# Patient Record
Sex: FEMALE
[De-identification: names, ages, dates, MRNs, and addresses within clinical notes are randomized per-mention and may not be internally consistent; named-entity substitution may affect disease eponyms.]

---

## 2017-07-17 ENCOUNTER — APPOINTMENT (OUTPATIENT)
Dept: INTERNAL MEDICINE | Facility: CLINIC | Age: 77
End: 2017-07-17

## 2017-10-23 ENCOUNTER — APPOINTMENT (OUTPATIENT)
Dept: INTERNAL MEDICINE | Facility: CLINIC | Age: 77
End: 2017-10-23
Payer: MEDICARE

## 2017-10-23 PROCEDURE — 99214 OFFICE O/P EST MOD 30 MIN: CPT

## 2018-04-02 ENCOUNTER — APPOINTMENT (OUTPATIENT)
Dept: INTERNAL MEDICINE | Facility: CLINIC | Age: 78
End: 2018-04-02

## 2018-10-19 ENCOUNTER — APPOINTMENT (OUTPATIENT)
Dept: INTERNAL MEDICINE | Facility: CLINIC | Age: 78
End: 2018-10-19
Payer: MEDICARE

## 2018-10-19 VITALS
DIASTOLIC BLOOD PRESSURE: 82 MMHG | SYSTOLIC BLOOD PRESSURE: 144 MMHG | TEMPERATURE: 99.2 F | WEIGHT: 139 LBS | HEIGHT: 60.63 IN | OXYGEN SATURATION: 98 % | HEART RATE: 62 BPM | BODY MASS INDEX: 26.59 KG/M2

## 2018-10-19 PROCEDURE — 99214 OFFICE O/P EST MOD 30 MIN: CPT

## 2018-10-19 NOTE — ASSESSMENT
[FreeTextEntry1] : F/U VISIT OF 78 Y OLD FEM WITH PMX OF NON HODGKIN LYMPHOMA 5 + YEARS ,RECENTLY DX WITH FOLLICULAR LYMPHOMA AFTER R AXILLARY BIOPSY ON ORAL CHEMO= F/U ONCOLOGY\par ANXIETY= REASSURANCE\par HTN= STABLE\par RTO 3 M OR PRN\par PT UP TO DATE WITH IMMUNIZATIONS

## 2018-10-19 NOTE — HISTORY OF PRESENT ILLNESS
[de-identified] : PT COMES FOR F/U,DX WITH RECURRENT LYMPHOMA BY AXILLARY BIOPSY,PLACE ON ORAL CHEMO AND BEEN SEEN WEEKLY BY HEM ONC,FEELING ANXIOUS BUT WITH NO SIDE EFFECTS

## 2018-10-19 NOTE — PHYSICAL EXAM

## 2019-02-22 ENCOUNTER — APPOINTMENT (OUTPATIENT)
Dept: INTERNAL MEDICINE | Facility: CLINIC | Age: 79
End: 2019-02-22
Payer: MEDICARE

## 2019-02-22 VITALS
WEIGHT: 139 LBS | OXYGEN SATURATION: 97 % | HEIGHT: 60.63 IN | SYSTOLIC BLOOD PRESSURE: 142 MMHG | TEMPERATURE: 97.9 F | RESPIRATION RATE: 12 BRPM | BODY MASS INDEX: 26.59 KG/M2 | DIASTOLIC BLOOD PRESSURE: 81 MMHG | HEART RATE: 61 BPM

## 2019-02-22 PROCEDURE — 99214 OFFICE O/P EST MOD 30 MIN: CPT

## 2019-02-22 NOTE — HISTORY OF PRESENT ILLNESS
[de-identified] : PT COMES FOR F/U;BEEN ON RESEARCH PROTOCOL FOR LYMPHOMA ON ? MED,WAS STOP RECENTLY DUE TO LFT'S ELEVATION\par CC OF RECURRENT LEFT KNEE PAIN,SEEN BY ORTHO IN THE PAST AND WAS HELPED BY PT\par CC OF SPLIT LEFT EAR LOBE 4 M AGO AND WANTS IT TO GET REPAIR SO SHE CAN USE EARING AGAIN\par FLUCTUATING APPETITE BEEN RX ENSURE BY ONCOLOGIST TAKING ONE A DAY

## 2019-02-22 NOTE — PHYSICAL EXAM

## 2019-02-22 NOTE — ASSESSMENT
[FreeTextEntry1] : F/U VISIT OF 78 Y OLD FEM WITH PMX OF LYMPHOMA= F/U ONCOLOGY\par ABNORMAL AST AND ALT AFTER RESEARCH PROTOCOL = AS PER ONCOLOGY \par OA LEFT KNEE= REFER TO PT\par SPLIT LEFT EAR LOBE= REFER TO SEE DERMATOLOGY\par DYSLIPIDEMIA= ONLIPITOR 10 MG AS LONG AS AST AND ALT ARE TRENDING DOWN AND OFF CHEMOTHERAPY

## 2019-04-01 ENCOUNTER — APPOINTMENT (OUTPATIENT)
Dept: INTERNAL MEDICINE | Facility: CLINIC | Age: 79
End: 2019-04-01
Payer: MEDICARE

## 2019-04-01 VITALS
OXYGEN SATURATION: 98 % | WEIGHT: 140 LBS | RESPIRATION RATE: 12 BRPM | SYSTOLIC BLOOD PRESSURE: 151 MMHG | BODY MASS INDEX: 26.78 KG/M2 | DIASTOLIC BLOOD PRESSURE: 87 MMHG | HEIGHT: 60.63 IN | TEMPERATURE: 97.8 F | HEART RATE: 65 BPM

## 2019-04-01 DIAGNOSIS — M17.12 UNILATERAL PRIMARY OSTEOARTHRITIS, LEFT KNEE: ICD-10-CM

## 2019-04-01 PROCEDURE — 99214 OFFICE O/P EST MOD 30 MIN: CPT

## 2019-04-01 NOTE — REVIEW OF SYSTEMS
[Muscle Pain] : muscle pain [Hair Changes] : hair changes [Negative] : Heme/Lymph [FreeTextEntry4] : SPLIT LEFT EAR LOBE

## 2019-04-01 NOTE — ASSESSMENT
[FreeTextEntry1] : F/U VISIT OF 78 Y OLD FEM WITH PMX OF FOLLICULAR LYMPHOMA= F/U ONCOLOGY( ON REMISSION)\par DYSLIPIDEMIA= CONTINUE LIPITOR\par SPLIT LEFT EAR LOBE= TO SEE PLASTIC SURGEON\par HAIR LOSS= F/U DERM\par ANXIETY= DISCUSSED\par RTO 3 M

## 2019-04-01 NOTE — HISTORY OF PRESENT ILLNESS
[de-identified] : PT COMES FOR F/U \par CC OF HAIR LOSS AFTER CHEMO,BEEN SEEN BY DERM\par CC OF LLE PARESTHESIAS WHILE IN BED,BETTER WHEN MOVING IT\par LEFT KNEE PAIN IS BETTER AFTER PT\par BRINGS LABS FROM ONCOLOGY WITH NORMALIZATION OF LIVER ENZYMES AND CONTROL CHOL LEVELS

## 2019-04-01 NOTE — PHYSICAL EXAM

## 2019-04-22 ENCOUNTER — APPOINTMENT (OUTPATIENT)
Dept: INTERNAL MEDICINE | Facility: CLINIC | Age: 79
End: 2019-04-22
Payer: MEDICARE

## 2019-04-22 VITALS
OXYGEN SATURATION: 98 % | SYSTOLIC BLOOD PRESSURE: 133 MMHG | RESPIRATION RATE: 14 BRPM | HEART RATE: 67 BPM | HEIGHT: 60 IN | TEMPERATURE: 98 F | BODY MASS INDEX: 28.27 KG/M2 | WEIGHT: 144 LBS | DIASTOLIC BLOOD PRESSURE: 77 MMHG

## 2019-04-22 PROCEDURE — 99213 OFFICE O/P EST LOW 20 MIN: CPT

## 2019-04-22 NOTE — HISTORY OF PRESENT ILLNESS
[de-identified] : PT COMES FOR F/U AFTER BEEN AT LOCAL HOSPITAL FOR LLE PAIN \par D/C ON NO MEDS,RECOMM MOTRIN PRN\par PAIN IS MUCH BETTER ABLE TO WALK AND CLIMB STAIRS WITH NO PAIN BUT CONCERN\par ALSO CC OF BACK PAIN,LEFT HIP PAIN AND LLE PAIN HOURS AFTER PT\par LASTLY STILL LOOKING TO HAVE REPAIR OF LEFT EAR LOBE

## 2019-04-22 NOTE — REVIEW OF SYSTEMS
[Joint Pain] : joint pain [Muscle Pain] : muscle pain [Back Pain] : back pain [Negative] : Heme/Lymph [de-identified] : SEE HPI

## 2019-04-22 NOTE — PHYSICAL EXAM
[No Acute Distress] : no acute distress [No JVD] : no jugular venous distention [No Respiratory Distress] : no respiratory distress  [Normal Rate] : normal rate  [No Edema] : there was no peripheral edema [de-identified] : FROM LEFT KNEE,MINOR DEFORMITYFROM LEFT HIP MINOR LEFT PARAVERTEBRAL LUMBAR MUSCLE TENDERNESS [de-identified] : SPLIT LEFT EAR LOBE

## 2019-04-22 NOTE — ASSESSMENT
[FreeTextEntry1] : F/U VISIT OF 78 Y OLD FEM AFTER SEEN AT ER FOR LEFT LUMBAR ,HIP AND KNEE PAIN 1 WEEK A MODIFIED PT SESSION RECOMM,REFER TO SEE ORTHO\par SPLIT LEFT EAR LOBE= PLASTIC SURGICAL REF

## 2019-08-09 ENCOUNTER — APPOINTMENT (OUTPATIENT)
Dept: INTERNAL MEDICINE | Facility: CLINIC | Age: 79
End: 2019-08-09
Payer: MEDICARE

## 2019-08-09 VITALS
HEART RATE: 67 BPM | SYSTOLIC BLOOD PRESSURE: 128 MMHG | TEMPERATURE: 98.5 F | OXYGEN SATURATION: 98 % | RESPIRATION RATE: 14 BRPM | BODY MASS INDEX: 27.68 KG/M2 | WEIGHT: 141 LBS | HEIGHT: 60 IN | DIASTOLIC BLOOD PRESSURE: 77 MMHG

## 2019-08-09 DIAGNOSIS — Q17.8 OTHER SPECIFIED CONGENITAL MALFORMATIONS OF EAR: ICD-10-CM

## 2019-08-09 PROCEDURE — 99214 OFFICE O/P EST MOD 30 MIN: CPT

## 2019-08-09 NOTE — ASSESSMENT
[FreeTextEntry1] : F/U VISIT OF 78 Y OLD FEM WITH PMX OF GRADE 1 FOLLICULAR LYMPHOMA= AS PER ONCOLOGY\par HTN AND DYSLIPIDEMIA= STABLE\par ANXIETY= REASSURANCE PROVIDED\par RTO IN 3 M OR PRN

## 2019-08-09 NOTE — PHYSICAL EXAM
[No Acute Distress] : no acute distress [Well Nourished] : well nourished [Well Developed] : well developed [Well-Appearing] : well-appearing [Normal Sclera/Conjunctiva] : normal sclera/conjunctiva [PERRL] : pupils equal round and reactive to light [EOMI] : extraocular movements intact [Normal Oropharynx] : the oropharynx was normal [Normal Outer Ear/Nose] : the outer ears and nose were normal in appearance [No Lymphadenopathy] : no lymphadenopathy [No JVD] : no jugular venous distention [Thyroid Normal, No Nodules] : the thyroid was normal and there were no nodules present [Supple] : supple [No Respiratory Distress] : no respiratory distress  [No Accessory Muscle Use] : no accessory muscle use [Clear to Auscultation] : lungs were clear to auscultation bilaterally [Regular Rhythm] : with a regular rhythm [Normal Rate] : normal rate  [Normal S1, S2] : normal S1 and S2 [No Murmur] : no murmur heard [No Abdominal Bruit] : a ~M bruit was not heard ~T in the abdomen [No Carotid Bruits] : no carotid bruits [No Varicosities] : no varicosities [Pedal Pulses Present] : the pedal pulses are present [No Edema] : there was no peripheral edema [No Palpable Aorta] : no palpable aorta [No Extremity Clubbing/Cyanosis] : no extremity clubbing/cyanosis [Soft] : abdomen soft [Non Tender] : non-tender [No Masses] : no abdominal mass palpated [Non-distended] : non-distended [No HSM] : no HSM [Normal Posterior Cervical Nodes] : no posterior cervical lymphadenopathy [Normal Bowel Sounds] : normal bowel sounds [No CVA Tenderness] : no CVA  tenderness [Normal Anterior Cervical Nodes] : no anterior cervical lymphadenopathy [No Spinal Tenderness] : no spinal tenderness [No Joint Swelling] : no joint swelling [Grossly Normal Strength/Tone] : grossly normal strength/tone [No Rash] : no rash [Coordination Grossly Intact] : coordination grossly intact [No Focal Deficits] : no focal deficits [Normal Gait] : normal gait [Normal Affect] : the affect was normal [Deep Tendon Reflexes (DTR)] : deep tendon reflexes were 2+ and symmetric [Normal Insight/Judgement] : insight and judgment were intact

## 2019-08-09 NOTE — HISTORY OF PRESENT ILLNESS
[de-identified] : PT COMES FOR F/U \par SEEN BY NEUROLOGY EVERY 4 M;SEEN BY ONCOLOGY YESTERDAY ,NO FURTHER RECOMM \par SEEN BY GEN SURGEON DR ALEJANDRO ZAVALA AND HAS GUERA FOR MAMMO AND BREAST SONO  8/15/19\par SEEN BY CARDIOLOGY 2 WEEKS AGO ,HAD LABS DONE AND CAROTID US ORDERED\par LOVAZA WAS D/C AND PLACED ON VASCEPA

## 2019-08-23 ENCOUNTER — APPOINTMENT (OUTPATIENT)
Dept: INTERNAL MEDICINE | Facility: CLINIC | Age: 79
End: 2019-08-23
Payer: MEDICARE

## 2019-08-23 VITALS
TEMPERATURE: 98.1 F | RESPIRATION RATE: 14 BRPM | DIASTOLIC BLOOD PRESSURE: 79 MMHG | SYSTOLIC BLOOD PRESSURE: 138 MMHG | WEIGHT: 142 LBS | BODY MASS INDEX: 27.88 KG/M2 | HEART RATE: 58 BPM | HEIGHT: 60 IN | OXYGEN SATURATION: 98 %

## 2019-08-23 PROCEDURE — 99214 OFFICE O/P EST MOD 30 MIN: CPT

## 2019-08-23 NOTE — ASSESSMENT
[FreeTextEntry1] : 79 Y OLD FEM WITH PMX OF LYMPHOMA AND ? INCREASE IN SIZE OF AXILLARY LYMPH NODE= REFER TO SEE ONCOLOGY  WITH CD FOR COMPARISONS ,SPOKE WITH SURGEON WHO RECOMM BIOPSY UNDER US GUIDANCE BY RADIOLOGY ,PT FIRST WANTS TO SEE ONCOLOGY AGAIN

## 2019-08-23 NOTE — HISTORY OF PRESENT ILLNESS
[de-identified] : PT WAS SENT BY SURGEON FOR FURTHER RECOMM REGARDING ABNORMAL BREAST/AXILLARY SONO.

## 2019-08-23 NOTE — PHYSICAL EXAM
[No Acute Distress] : no acute distress [No JVD] : no jugular venous distention [No Respiratory Distress] : no respiratory distress  [Normal Rate] : normal rate

## 2019-11-13 ENCOUNTER — APPOINTMENT (OUTPATIENT)
Dept: INTERNAL MEDICINE | Facility: CLINIC | Age: 79
End: 2019-11-13
Payer: MEDICARE

## 2019-11-13 VITALS
HEART RATE: 58 BPM | OXYGEN SATURATION: 98 % | BODY MASS INDEX: 28.66 KG/M2 | SYSTOLIC BLOOD PRESSURE: 138 MMHG | TEMPERATURE: 98.3 F | WEIGHT: 146 LBS | HEIGHT: 60 IN | RESPIRATION RATE: 14 BRPM | DIASTOLIC BLOOD PRESSURE: 74 MMHG

## 2019-11-13 PROCEDURE — G0008: CPT

## 2019-11-13 PROCEDURE — 99397 PER PM REEVAL EST PAT 65+ YR: CPT | Mod: 25,GY

## 2019-11-13 PROCEDURE — 90662 IIV NO PRSV INCREASED AG IM: CPT

## 2019-11-13 NOTE — ASSESSMENT
[FreeTextEntry1] : CPE OF 79 Y OLD FEM WITH PMX OF HTN ,DYSLIPIDEMIA ,ANXIETY DISORDER AND FOLLICULAR LYMPHOMA= INFLUENZA VACCINE TODAY \par LABS REVIEWED AND STABLE\par RO IN 4 M

## 2019-11-13 NOTE — PHYSICAL EXAM
[No Acute Distress] : no acute distress [Well Nourished] : well nourished [Well Developed] : well developed [Well-Appearing] : well-appearing [Normal Sclera/Conjunctiva] : normal sclera/conjunctiva [PERRL] : pupils equal round and reactive to light [EOMI] : extraocular movements intact [Normal Outer Ear/Nose] : the outer ears and nose were normal in appearance [Normal Oropharynx] : the oropharynx was normal [No JVD] : no jugular venous distention [No Lymphadenopathy] : no lymphadenopathy [Supple] : supple [No Respiratory Distress] : no respiratory distress  [Thyroid Normal, No Nodules] : the thyroid was normal and there were no nodules present [No Accessory Muscle Use] : no accessory muscle use [Clear to Auscultation] : lungs were clear to auscultation bilaterally [Regular Rhythm] : with a regular rhythm [Normal Rate] : normal rate  [Normal S1, S2] : normal S1 and S2 [No Murmur] : no murmur heard [No Carotid Bruits] : no carotid bruits [No Abdominal Bruit] : a ~M bruit was not heard ~T in the abdomen [Pedal Pulses Present] : the pedal pulses are present [No Edema] : there was no peripheral edema [No Extremity Clubbing/Cyanosis] : no extremity clubbing/cyanosis [No Palpable Aorta] : no palpable aorta [Non Tender] : non-tender [Soft] : abdomen soft [Non-distended] : non-distended [No Masses] : no abdominal mass palpated [No HSM] : no HSM [Normal Bowel Sounds] : normal bowel sounds [Normal Posterior Cervical Nodes] : no posterior cervical lymphadenopathy [Normal Anterior Cervical Nodes] : no anterior cervical lymphadenopathy [No CVA Tenderness] : no CVA  tenderness [No Spinal Tenderness] : no spinal tenderness [No Joint Swelling] : no joint swelling [Grossly Normal Strength/Tone] : grossly normal strength/tone [No Rash] : no rash [No Focal Deficits] : no focal deficits [Coordination Grossly Intact] : coordination grossly intact [Deep Tendon Reflexes (DTR)] : deep tendon reflexes were 2+ and symmetric [Normal Gait] : normal gait [Normal Affect] : the affect was normal [Normal Insight/Judgement] : insight and judgment were intact [de-identified] : SMALL BORA LOWER EXTR VARICOSE VEINS ,NON TENDER

## 2019-11-13 NOTE — HISTORY OF PRESENT ILLNESS
[de-identified] : PT DUE FOR CPE\par SEEN BY CARDIOLOGY WITH LABS AND EKG \par WILL SEE HEMATOLOGY IN AM\par DEVELOPED LEFT SHIN ECCHYMOSIS  RECENTLY WITH ? NO TRAUMA ,NOW RESOLVED

## 2020-05-20 ENCOUNTER — APPOINTMENT (OUTPATIENT)
Dept: INTERNAL MEDICINE | Facility: CLINIC | Age: 80
End: 2020-05-20
Payer: COMMERCIAL

## 2020-05-20 VITALS
TEMPERATURE: 98.6 F | SYSTOLIC BLOOD PRESSURE: 126 MMHG | RESPIRATION RATE: 14 BRPM | HEART RATE: 63 BPM | HEIGHT: 60 IN | WEIGHT: 148 LBS | DIASTOLIC BLOOD PRESSURE: 73 MMHG | BODY MASS INDEX: 29.06 KG/M2 | OXYGEN SATURATION: 96 %

## 2020-05-20 PROCEDURE — 99214 OFFICE O/P EST MOD 30 MIN: CPT

## 2020-05-20 RX ORDER — FLUTICASONE FUROATE AND VILANTEROL TRIFENATATE 200; 25 UG/1; UG/1
200-25 POWDER RESPIRATORY (INHALATION)
Qty: 180 | Refills: 0 | Status: ACTIVE | COMMUNITY
Start: 2020-05-09

## 2020-05-20 RX ORDER — ALBUTEROL SULFATE 90 UG/1
108 (90 BASE) INHALANT RESPIRATORY (INHALATION)
Qty: 54 | Refills: 0 | Status: ACTIVE | COMMUNITY
Start: 2020-04-17

## 2020-05-20 NOTE — ASSESSMENT
[FreeTextEntry1] : F/U VISIT OF 79 Y OLD FEM WITH PMX OF HTN ,DYSLIPIDEMIA ,FOLLICULAR LYMPHOMA AND GABE= LABS REVIEWED\par RTO IN 4 M \par F/U ONCOLOGY

## 2020-05-20 NOTE — PHYSICAL EXAM
[No Acute Distress] : no acute distress [Well Nourished] : well nourished [Well Developed] : well developed [Well-Appearing] : well-appearing [Normal Sclera/Conjunctiva] : normal sclera/conjunctiva [PERRL] : pupils equal round and reactive to light [EOMI] : extraocular movements intact [No JVD] : no jugular venous distention [No Lymphadenopathy] : no lymphadenopathy [Supple] : supple [Thyroid Normal, No Nodules] : the thyroid was normal and there were no nodules present [No Respiratory Distress] : no respiratory distress  [Clear to Auscultation] : lungs were clear to auscultation bilaterally [No Accessory Muscle Use] : no accessory muscle use [Normal Rate] : normal rate  [Regular Rhythm] : with a regular rhythm [Normal S1, S2] : normal S1 and S2 [No Murmur] : no murmur heard [No Carotid Bruits] : no carotid bruits [No Abdominal Bruit] : a ~M bruit was not heard ~T in the abdomen [No Varicosities] : no varicosities [Pedal Pulses Present] : the pedal pulses are present [No Edema] : there was no peripheral edema [No Palpable Aorta] : no palpable aorta [No Extremity Clubbing/Cyanosis] : no extremity clubbing/cyanosis [Soft] : abdomen soft [Non-distended] : non-distended [Non Tender] : non-tender [No Masses] : no abdominal mass palpated [No HSM] : no HSM [Normal Bowel Sounds] : normal bowel sounds [Normal Posterior Cervical Nodes] : no posterior cervical lymphadenopathy [Normal Anterior Cervical Nodes] : no anterior cervical lymphadenopathy [No CVA Tenderness] : no CVA  tenderness [No Spinal Tenderness] : no spinal tenderness [No Joint Swelling] : no joint swelling [Grossly Normal Strength/Tone] : grossly normal strength/tone [No Rash] : no rash [Coordination Grossly Intact] : coordination grossly intact [No Focal Deficits] : no focal deficits [Normal Gait] : normal gait [Normal Affect] : the affect was normal [Deep Tendon Reflexes (DTR)] : deep tendon reflexes were 2+ and symmetric [Normal Insight/Judgement] : insight and judgment were intact

## 2020-05-20 NOTE — HISTORY OF PRESENT ILLNESS
[de-identified] : PT COMES FOR F/U \par SEEN BY CARDIOLOGY RECENTLY \par HAS GUERA TO SEE ONCOLOGY AND GET CT CHEST AND ABD 6/2020

## 2020-07-10 ENCOUNTER — APPOINTMENT (OUTPATIENT)
Dept: INTERNAL MEDICINE | Facility: CLINIC | Age: 80
End: 2020-07-10
Payer: MEDICARE

## 2020-07-10 VITALS
WEIGHT: 148 LBS | RESPIRATION RATE: 14 BRPM | DIASTOLIC BLOOD PRESSURE: 83 MMHG | HEIGHT: 60 IN | SYSTOLIC BLOOD PRESSURE: 139 MMHG | BODY MASS INDEX: 29.06 KG/M2 | HEART RATE: 62 BPM | OXYGEN SATURATION: 94 % | TEMPERATURE: 98.3 F

## 2020-07-10 PROCEDURE — 99214 OFFICE O/P EST MOD 30 MIN: CPT

## 2020-07-10 NOTE — ASSESSMENT
[FreeTextEntry1] : 79 Y OLD FEM WITH GRADE 1 FOLLICULAR LYMPHOMA= F/U HEM- ONC\par SHORT TERM MEMORY LOSS= F/U NEUROLOGY\par HTN ,DYSLIPIDEMIA AND THORACIC AORTA ANEURYSM = F/U CARDIOLOGY \par COVID-19 IGG TODAY \par F/U IN 3 M OR PRN

## 2020-07-10 NOTE — HISTORY OF PRESENT ILLNESS
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 TAKING NAMENDA 10 MG BID , PT IS NOT SURE\par WANT TO HAVE IGG COVID-19 TEST TODAY\par

## 2020-07-10 NOTE — REVIEW OF SYSTEMS
[Itching] : Itching [Skin Rash] : skin rash [Anxiety] : anxiety [Memory Loss] : memory loss [Negative] : Heme/Lymph

## 2020-07-10 NOTE — PHYSICAL EXAM
[Normal] : normal gait, coordination grossly intact, no focal deficits and deep tendon reflexes were 2+ and symmetric [Short Term Intact] : short term memory impaired

## 2020-07-16 LAB
SARS-COV-2 IGG SERPL IA-ACNC: 0.01 INDEX
SARS-COV-2 IGG SERPL QL IA: NEGATIVE

## 2020-10-16 ENCOUNTER — APPOINTMENT (OUTPATIENT)
Dept: INTERNAL MEDICINE | Facility: CLINIC | Age: 80
End: 2020-10-16
Payer: MEDICARE

## 2020-10-16 VITALS
SYSTOLIC BLOOD PRESSURE: 140 MMHG | HEART RATE: 55 BPM | BODY MASS INDEX: 28.47 KG/M2 | TEMPERATURE: 98.2 F | HEIGHT: 60 IN | WEIGHT: 145 LBS | OXYGEN SATURATION: 96 % | DIASTOLIC BLOOD PRESSURE: 82 MMHG | RESPIRATION RATE: 14 BRPM

## 2020-10-16 PROCEDURE — 99214 OFFICE O/P EST MOD 30 MIN: CPT

## 2020-10-16 NOTE — ASSESSMENT
[FreeTextEntry1] : 80 Y OLD FEM WITH PMX OF FOLLICULAR LYMPHOMA= F/U HEMATOLOGY \par HTN  AND DYSLIPIDEMIA= STABLE\par ANXIETY AND MILD SHORT TERM MEMORY LOSS= AS PER NEUROLOGY \par RTO IN 3 M \par INFLUENZA VACCINE RECOMM

## 2020-10-16 NOTE — HISTORY OF PRESENT ILLNESS
[de-identified] : PT COMES FOR F/U HTN ,DYSLIPIDEMIA ,IFG AND BRINGS RESULTS OF CAROTID DOPPLER WITH MILD ATHERSCLEROSIS

## 2021-02-05 ENCOUNTER — APPOINTMENT (OUTPATIENT)
Dept: INTERNAL MEDICINE | Facility: CLINIC | Age: 81
End: 2021-02-05
Payer: MEDICARE

## 2021-02-05 VITALS
HEIGHT: 60 IN | TEMPERATURE: 97.8 F | SYSTOLIC BLOOD PRESSURE: 139 MMHG | HEART RATE: 60 BPM | BODY MASS INDEX: 28.86 KG/M2 | DIASTOLIC BLOOD PRESSURE: 89 MMHG | OXYGEN SATURATION: 96 % | WEIGHT: 147 LBS | RESPIRATION RATE: 15 BRPM

## 2021-02-05 VITALS — DIASTOLIC BLOOD PRESSURE: 78 MMHG | SYSTOLIC BLOOD PRESSURE: 130 MMHG

## 2021-02-05 DIAGNOSIS — C82.04: ICD-10-CM

## 2021-02-05 LAB
BASOPHILS # BLD AUTO: 0.03 K/UL
BASOPHILS NFR BLD AUTO: 0.4 %
EOSINOPHIL # BLD AUTO: 0.14 K/UL
EOSINOPHIL NFR BLD AUTO: 1.7 %
HCT VFR BLD CALC: 43 %
HGB BLD-MCNC: 13.7 G/DL
IMM GRANULOCYTES NFR BLD AUTO: 0.2 %
LYMPHOCYTES # BLD AUTO: 1.28 K/UL
LYMPHOCYTES NFR BLD AUTO: 15.6 %
MAN DIFF?: NORMAL
MCHC RBC-ENTMCNC: 30.6 PG
MCHC RBC-ENTMCNC: 31.9 GM/DL
MCV RBC AUTO: 96 FL
MONOCYTES # BLD AUTO: 0.65 K/UL
MONOCYTES NFR BLD AUTO: 7.9 %
NEUTROPHILS # BLD AUTO: 6.08 K/UL
NEUTROPHILS NFR BLD AUTO: 74.2 %
PLATELET # BLD AUTO: 149 K/UL
RBC # BLD: 4.48 M/UL
RBC # FLD: 13 %
WBC # FLD AUTO: 8.2 K/UL

## 2021-02-05 PROCEDURE — 99072 ADDL SUPL MATRL&STAF TM PHE: CPT

## 2021-02-05 PROCEDURE — G0439: CPT

## 2021-02-05 NOTE — HISTORY OF PRESENT ILLNESS
[de-identified] : PT COMES FOR CPE \par SEEN BY NEUROLOGY AND ONCOLOGY RECENTLY \par CONCERN ABOUT FLUCTUATING BP READINGS AT HOME

## 2021-02-05 NOTE — ASSESSMENT
[FreeTextEntry1] : CPE OF 80 Y OLD FEM WITH PMX OF FOLLICULAR LYMPHOMA,HTN ,DYSLIPIDEMIA AND GABE= LABS TODAY \par F/U ONCO AND CARDIO\par RTO IN 3 M

## 2021-02-06 LAB
25(OH)D3 SERPL-MCNC: 37.3 NG/ML
ALBUMIN SERPL ELPH-MCNC: 4.5 G/DL
ALP BLD-CCNC: 91 U/L
ALT SERPL-CCNC: 23 U/L
ANION GAP SERPL CALC-SCNC: 13 MMOL/L
AST SERPL-CCNC: 27 U/L
BILIRUB SERPL-MCNC: 0.5 MG/DL
BUN SERPL-MCNC: 15 MG/DL
CALCIUM SERPL-MCNC: 10.2 MG/DL
CHLORIDE SERPL-SCNC: 102 MMOL/L
CHOLEST SERPL-MCNC: 161 MG/DL
CO2 SERPL-SCNC: 26 MMOL/L
CREAT SERPL-MCNC: 0.92 MG/DL
GLUCOSE SERPL-MCNC: 135 MG/DL
HDLC SERPL-MCNC: 54 MG/DL
LDLC SERPL CALC-MCNC: 79 MG/DL
NONHDLC SERPL-MCNC: 107 MG/DL
POTASSIUM SERPL-SCNC: 4.4 MMOL/L
PROT SERPL-MCNC: 6.8 G/DL
SODIUM SERPL-SCNC: 141 MMOL/L
TRIGL SERPL-MCNC: 139 MG/DL
TSH SERPL-ACNC: 1.51 UIU/ML

## 2021-02-09 ENCOUNTER — APPOINTMENT (OUTPATIENT)
Dept: INTERNAL MEDICINE | Facility: CLINIC | Age: 81
End: 2021-02-09

## 2021-02-26 LAB
APPEARANCE: CLEAR
BILIRUBIN URINE: NEGATIVE
BLOOD URINE: NEGATIVE
COLOR: NORMAL
GLUCOSE QUALITATIVE U: NEGATIVE
KETONES URINE: NEGATIVE
LEUKOCYTE ESTERASE URINE: NEGATIVE
NITRITE URINE: NEGATIVE
PH URINE: 5.5
PROTEIN URINE: NEGATIVE
SPECIFIC GRAVITY URINE: 1.01
UROBILINOGEN URINE: NORMAL

## 2021-05-28 ENCOUNTER — APPOINTMENT (OUTPATIENT)
Dept: INTERNAL MEDICINE | Facility: CLINIC | Age: 81
End: 2021-05-28

## 2021-06-04 ENCOUNTER — APPOINTMENT (OUTPATIENT)
Dept: INTERNAL MEDICINE | Facility: CLINIC | Age: 81
End: 2021-06-04
Payer: MEDICARE

## 2021-06-04 VITALS
WEIGHT: 140 LBS | DIASTOLIC BLOOD PRESSURE: 82 MMHG | TEMPERATURE: 98.6 F | OXYGEN SATURATION: 95 % | RESPIRATION RATE: 14 BRPM | SYSTOLIC BLOOD PRESSURE: 135 MMHG | BODY MASS INDEX: 27.48 KG/M2 | HEART RATE: 61 BPM | HEIGHT: 60 IN

## 2021-06-04 PROCEDURE — 99214 OFFICE O/P EST MOD 30 MIN: CPT

## 2021-06-04 RX ORDER — DICLOFENAC SODIUM 1% 10 MG/G
1 GEL TOPICAL
Qty: 1 | Refills: 5 | Status: ACTIVE | COMMUNITY
Start: 2021-06-04 | End: 1900-01-01

## 2021-06-04 NOTE — HISTORY OF PRESENT ILLNESS
[FreeTextEntry8] : ABOUT TO WEEKS AGO PT START HAVING R INGUINAL PAIN ;WORSE IN CERTAIN POSITIONS AND INCREASED INTENSITY TO GET UP FROM BED OR CHAIR .UPON QUESTIONING SHE RECALLS NEAR FALL WHEN SHE SLIPPED ON WET FLOOR THE DAY BEFORE PAIN DEVELOPED \par WENT TO SEE ONCOLOGIST WHO RECOMM HER TO SEE ME \par LABS BY ONCOLOGY REVIEWED WITH  AS ONLY ABNORMAL

## 2021-06-04 NOTE — ASSESSMENT
[FreeTextEntry1] : 80 Y OLD FEM PRESENTS WITH 2 WEEKS OF R INGUINAL MUSCLE PAIN - HIP FLEXOR STRAIN AFTER NEAR FALL DAY BEFORE SX BEGAN= RECOMM PT BUT DECLINES ;MELOXICAM AND VOLTAREN GEL RX SENT \par RTO PRN  \par IFG = EXPLAINED\par ANXIETY = REASSURANCE

## 2021-06-04 NOTE — PHYSICAL EXAM
[No Respiratory Distress] : no respiratory distress  [Normal Rate] : normal rate  [No Edema] : there was no peripheral edema [Soft] : abdomen soft [Non Tender] : non-tender [No Hernias] : no hernias [Normal] : normal gait, coordination grossly intact, no focal deficits and deep tendon reflexes were 2+ and symmetric [Anxious] : anxious [de-identified] : PAINFUL R HIP FLEXION

## 2021-08-03 ENCOUNTER — APPOINTMENT (OUTPATIENT)
Dept: INTERNAL MEDICINE | Facility: CLINIC | Age: 81
End: 2021-08-03

## 2021-11-18 ENCOUNTER — RX RENEWAL (OUTPATIENT)
Age: 81
End: 2021-11-18

## 2021-11-22 ENCOUNTER — APPOINTMENT (OUTPATIENT)
Dept: INTERNAL MEDICINE | Facility: CLINIC | Age: 81
End: 2021-11-22
Payer: MEDICARE

## 2021-11-22 VITALS
BODY MASS INDEX: 26.5 KG/M2 | HEART RATE: 62 BPM | OXYGEN SATURATION: 96 % | HEIGHT: 60 IN | TEMPERATURE: 98.3 F | DIASTOLIC BLOOD PRESSURE: 76 MMHG | SYSTOLIC BLOOD PRESSURE: 123 MMHG | WEIGHT: 135 LBS | RESPIRATION RATE: 14 BRPM

## 2021-11-22 PROCEDURE — 99214 OFFICE O/P EST MOD 30 MIN: CPT

## 2021-11-22 RX ORDER — NUT.TX.IMPAIRED DIGESTIVE FXN 0.035-1/ML
LIQUID (ML) ORAL
Qty: 60 | Refills: 5 | Status: DISCONTINUED | OUTPATIENT
Start: 2019-02-22 | End: 2021-11-22

## 2021-11-22 NOTE — HISTORY OF PRESENT ILLNESS
[de-identified] : COMES FOR F/U \par SEEN BY CARDIOLOGY AND ANOTHER INTERNIST RECENTLY WITH LABS,CXR AND ECHO \par HAD COVID-19 VACCINE X3 AND INFLUENZA VACCINE AS WELL

## 2021-11-22 NOTE — ASSESSMENT
[FreeTextEntry1] : 81 Y OLD FEM WITH PMX OF HTN ,FOLLICULAR LYMPHOMA  AND IFG = LABS CXR AND ECHO REVIEWED\par OSTEOPOROSIS ON ALENDRONATE= BONE DENSITY ORDERED\par RTO 3 M

## 2021-11-22 NOTE — PHYSICAL EXAM
[Normal] : soft, non-tender, non-distended, no masses palpated, no HSM and normal bowel sounds [No Rash] : no rash [Coordination Grossly Intact] : coordination grossly intact [Anxious] : anxious

## 2021-12-01 ENCOUNTER — APPOINTMENT (OUTPATIENT)
Dept: CARE COORDINATION | Facility: HOME HEALTH | Age: 81
End: 2021-12-01
Payer: MEDICARE

## 2021-12-01 DIAGNOSIS — Z85.72 PERSONAL HISTORY OF NON-HODGKIN LYMPHOMAS: ICD-10-CM

## 2021-12-01 DIAGNOSIS — Z00.00 ENCOUNTER FOR GENERAL ADULT MEDICAL EXAMINATION W/OUT ABNORMAL FINDINGS: ICD-10-CM

## 2021-12-01 PROCEDURE — 99348 HOME/RES VST EST LOW MDM 30: CPT | Mod: 95

## 2021-12-03 RX ORDER — MELOXICAM 15 MG/1
15 TABLET ORAL DAILY
Qty: 15 | Refills: 0 | Status: DISCONTINUED | COMMUNITY
Start: 2021-06-04 | End: 2021-12-03

## 2021-12-03 RX ORDER — MEMANTINE HYDROCHLORIDE 10 MG/1
10 TABLET, FILM COATED ORAL TWICE DAILY
Refills: 0 | Status: ACTIVE | COMMUNITY
Start: 2021-12-03

## 2021-12-03 RX ORDER — ASPIRIN ENTERIC COATED TABLETS 81 MG 81 MG/1
81 TABLET, DELAYED RELEASE ORAL DAILY
Refills: 0 | Status: ACTIVE | COMMUNITY
Start: 2021-12-03

## 2021-12-03 RX ORDER — MONTELUKAST 10 MG/1
10 TABLET, FILM COATED ORAL DAILY
Refills: 0 | Status: ACTIVE | COMMUNITY
Start: 2021-12-03

## 2021-12-03 NOTE — HISTORY OF PRESENT ILLNESS
[Home] : at home, [unfilled] , at the time of the visit. [Other Location: e.g. Home (Enter Location, City,State)___] : at [unfilled] [Verbal consent obtained from patient] : the patient, [unfilled] [FreeTextEntry1] : follow up [de-identified] : 82 y/o/f with pmhx HTN, HLD, hx of lymphoma in remission, asthma, memory loss\par HTN/HLD: stable, denies any issues, compliant on regimen\par Asthma: stable, on medication \par Hx of Lymphoma- sees heme/onc yearly, seen 2021 in remission, f/u in March 2022 for scanning\par Memory loss:  denies any worsening s/s, seen neuro 11/2021- visit stable, to f/u in 1 year \par Health maint:\par 	Seen pcp last week, bloodwork stable\par 	Mammo: 2019\par 	\par Vaccines\par 	COVID: UTD \par Flu vac: UTD\par

## 2021-12-03 NOTE — PHYSICAL EXAM
[No Acute Distress] : no acute distress [Well Nourished] : well nourished [Well Developed] : well developed [No Respiratory Distress] : no respiratory distress  [No Accessory Muscle Use] : no accessory muscle use [Clear to Auscultation] : lungs were clear to auscultation bilaterally [Normal Rate] : normal rate  [Regular Rhythm] : with a regular rhythm [Pedal Pulses Present] : the pedal pulses are present [No Edema] : there was no peripheral edema [Soft] : abdomen soft [Non Tender] : non-tender [Non-distended] : non-distended [Normal Bowel Sounds] : normal bowel sounds

## 2021-12-03 NOTE — REVIEW OF SYSTEMS
[Negative] : Psychiatric [FreeTextEntry5] : see HPI  [FreeTextEntry6] : see HPI  [de-identified] : see HPI

## 2022-03-30 ENCOUNTER — APPOINTMENT (OUTPATIENT)
Dept: INTERNAL MEDICINE | Facility: CLINIC | Age: 82
End: 2022-03-30
Payer: MEDICARE

## 2022-03-30 VITALS
SYSTOLIC BLOOD PRESSURE: 131 MMHG | OXYGEN SATURATION: 97 % | HEIGHT: 60 IN | DIASTOLIC BLOOD PRESSURE: 70 MMHG | BODY MASS INDEX: 24.94 KG/M2 | WEIGHT: 127 LBS | HEART RATE: 61 BPM | TEMPERATURE: 98.2 F

## 2022-03-30 PROCEDURE — 99214 OFFICE O/P EST MOD 30 MIN: CPT

## 2022-03-30 NOTE — ASSESSMENT
[FreeTextEntry1] : 81 Y OLD FEM WITH PMX OF HTN ,DYSLIPIDEMIA AND IFG  WHO WILL UNDERGO LEFT CATARACT SURGERY IN 5 WEEKS = RTO FOR PREOP AND LABS IN 2 WEEKS \par HX OF LYMPHOMA = F/U ONCOLOGY \par OSTEOPENIA= VIT D SUPPL DAILY ,DAILY WALKING

## 2022-03-30 NOTE — HISTORY OF PRESENT ILLNESS
[de-identified] : COMES OF R F/U \par SEEN BY OPHTHA ;WILL HAVE LEFT CATARACT SURGERY 5/4/22\par SEEN B Y CARDIOLOGY ,NO CHANGE IN MEDS\par SEEN BY ONCOLOGY =ABNORMAL CT AND SENT FOR MRI OF LIVE R? ,NO REPORTS AVAILABLE \par HAD BONE DENSITY 3 WEEKS WITH PERSISTENT OSTEOPENIA

## 2022-04-20 ENCOUNTER — APPOINTMENT (OUTPATIENT)
Dept: INTERNAL MEDICINE | Facility: CLINIC | Age: 82
End: 2022-04-20

## 2022-05-04 ENCOUNTER — APPOINTMENT (OUTPATIENT)
Dept: INTERNAL MEDICINE | Facility: CLINIC | Age: 82
End: 2022-05-04
Payer: MEDICARE

## 2022-05-04 VITALS
HEART RATE: 63 BPM | RESPIRATION RATE: 14 BRPM | OXYGEN SATURATION: 98 % | DIASTOLIC BLOOD PRESSURE: 74 MMHG | TEMPERATURE: 98 F | SYSTOLIC BLOOD PRESSURE: 138 MMHG | HEIGHT: 61.5 IN | WEIGHT: 132 LBS | BODY MASS INDEX: 24.6 KG/M2

## 2022-05-04 DIAGNOSIS — R73.01 IMPAIRED FASTING GLUCOSE: ICD-10-CM

## 2022-05-04 DIAGNOSIS — F41.9 ANXIETY DISORDER, UNSPECIFIED: ICD-10-CM

## 2022-05-04 DIAGNOSIS — R41.3 OTHER AMNESIA: ICD-10-CM

## 2022-05-04 DIAGNOSIS — E78.5 HYPERLIPIDEMIA, UNSPECIFIED: ICD-10-CM

## 2022-05-04 PROCEDURE — 99214 OFFICE O/P EST MOD 30 MIN: CPT

## 2022-05-04 NOTE — HISTORY OF PRESENT ILLNESS
[de-identified] : COMES FOR F/U \par SEEN BY 2ND OPINION OPHTHA AND SHE DECIDED AGAINST SURGERY AT PRESENT TIME \par SEEN BY INSURANCE NURSE WHO RECOMM HHA AND TDAP- PNEUMOCOCCAL VACCINE \par HAD COVID-19 4TH DOSE 2 WEEKS AGO \par SEEN BY ONCOLOGY AND CARDIOLOGY RECENTLY WITH LABS

## 2022-05-04 NOTE — PHYSICAL EXAM
[Normal] : soft, non-tender, non-distended, no masses palpated, no HSM and normal bowel sounds [Coordination Grossly Intact] : coordination grossly intact [No Focal Deficits] : no focal deficits [Alert and Oriented x3] : oriented to person, place, and time

## 2022-05-04 NOTE — ASSESSMENT
[FreeTextEntry1] : 81 Y OLD FEM WITH PMX OF HTN ,IFG ,DYSLIPIDEMIA ,LYMPHOMA AND SHORT TERM MEMORY IMPAIRMENT = LABS AND REPORTS SEEN AND DISCUSSED\par RTO 3 M \par

## 2022-05-23 ENCOUNTER — RX RENEWAL (OUTPATIENT)
Age: 82
End: 2022-05-23

## 2022-05-24 RX ORDER — ALENDRONATE SODIUM 70 MG/1
70 TABLET ORAL
Qty: 12 | Refills: 0 | Status: ACTIVE | COMMUNITY
Start: 2021-08-09 | End: 1900-01-01

## 2022-07-19 ENCOUNTER — APPOINTMENT (OUTPATIENT)
Dept: FAMILY MEDICINE | Facility: CLINIC | Age: 82
End: 2022-07-19

## 2022-07-19 VITALS
RESPIRATION RATE: 14 BRPM | WEIGHT: 132 LBS | OXYGEN SATURATION: 98 % | BODY MASS INDEX: 24.6 KG/M2 | TEMPERATURE: 98.7 F | DIASTOLIC BLOOD PRESSURE: 74 MMHG | HEART RATE: 65 BPM | SYSTOLIC BLOOD PRESSURE: 134 MMHG | HEIGHT: 61.5 IN

## 2022-07-19 DIAGNOSIS — Z87.39 PERSONAL HISTORY OF OTHER DISEASES OF THE MUSCULOSKELETAL SYSTEM AND CONNECTIVE TISSUE: ICD-10-CM

## 2022-07-19 DIAGNOSIS — M25.562 PAIN IN LEFT KNEE: ICD-10-CM

## 2022-07-19 DIAGNOSIS — Z20.822 CONTACT WITH AND (SUSPECTED) EXPOSURE TO COVID-19: ICD-10-CM

## 2022-07-19 DIAGNOSIS — U07.1 COVID-19: ICD-10-CM

## 2022-07-19 DIAGNOSIS — J45.909 UNSPECIFIED ASTHMA, UNCOMPLICATED: ICD-10-CM

## 2022-07-19 DIAGNOSIS — R59.0 LOCALIZED ENLARGED LYMPH NODES: ICD-10-CM

## 2022-07-19 DIAGNOSIS — C82.90 FOLLICULAR LYMPHOMA, UNSPECIFIED, UNSPECIFIED SITE: ICD-10-CM

## 2022-07-19 DIAGNOSIS — I10 ESSENTIAL (PRIMARY) HYPERTENSION: ICD-10-CM

## 2022-07-19 DIAGNOSIS — Z86.16 PERSONAL HISTORY OF COVID-19: ICD-10-CM

## 2022-07-19 DIAGNOSIS — Z12.4 ENCOUNTER FOR SCREENING FOR MALIGNANT NEOPLASM OF CERVIX: ICD-10-CM

## 2022-07-19 PROCEDURE — 99213 OFFICE O/P EST LOW 20 MIN: CPT | Mod: 25

## 2022-07-19 PROCEDURE — 36415 COLL VENOUS BLD VENIPUNCTURE: CPT

## 2022-07-19 RX ORDER — ICOSAPENT ETHYL 1000 MG/1
1 CAPSULE ORAL
Qty: 360 | Refills: 0 | Status: ACTIVE | COMMUNITY
Start: 2022-05-23

## 2022-07-19 RX ORDER — ATENOLOL 50 MG/1
50 TABLET ORAL
Qty: 90 | Refills: 0 | Status: ACTIVE | COMMUNITY
Start: 2022-05-23

## 2022-07-19 NOTE — REVIEW OF SYSTEMS
[Negative] : Heme/Lymph [Dysuria] : no dysuria [Incontinence] : no incontinence [Nocturia] : no nocturia [Poor Libido] : libido not poor [Hematuria] : no hematuria [Frequency] : no frequency [Vaginal Discharge] : no vaginal discharge [Dysmenorrhea] : no dysmenorrhea [FreeTextEntry8] : + left inguinal lymphadenopathy

## 2022-07-19 NOTE — ADDENDUM
[FreeTextEntry1] : \par \par Spoke with patient's niece after patient left office to leave a message for patient regarding my conversation with patient's oncologist Dr. Trotter. I discussed the case with Dr. Trotter and advised pt to f/u sooner for evaluation of chronic left inguinal lymphadenopthy.\par

## 2022-07-19 NOTE — ASSESSMENT
[FreeTextEntry1] : 80 yo F with hx of HTN, HLD, asthma, anxiety d/o, follicular lymphoma presents for f/u after COVID19 infection.

## 2022-07-19 NOTE — PLAN
[FreeTextEntry1] : \par \par #Covid19 infection\par -Resolved\par -Pt is s/p covid 19 vaccination x 2, and booster x 2 \par \par #Follicular lymphoma \par #Left inguinal lymphadenopathy\par -Spoke with patient's Oncologist Dr. Trotter who reports CT chest, abdomen and pelvis - 3/2022- left inguinal nodes 2.6 cm\par -Advised pt f/u with Heme/onc within 1 month\par -F/u with GYN for cervical cancer screening- given hx of follicular lymphoma \par -CBC, CMP and LDH drawn today\par \par #HLD\par -Atorvastatin refilled as requested\par -Continue vascepa\par \par #Asthma\par -Continue albuterol prn\par \par #HTN\par -Continue lisinopril 20 mg QD\par -Continue atenolol 50 mg QD\par \par F/u with PCP after Heme/onc evaluation\par

## 2022-07-19 NOTE — HISTORY OF PRESENT ILLNESS
[[employer] ] : provided by [employer]   [FreeTextEntry1] : f/u after covid19 infection [Interpreters_IDNumber] : [id number] [Interpreters_FullName] : Abdulaziz [[id number]] : Dutch [de-identified] : 82 yo F with hx of HTN, HLD, asthma, anxiety d/o, follicular lymphoma presents for f/u after COVID19 infection.\par Patient reports on 7/6/2022 she tested positive for covid19. She reports cough that resolved and denies fever, chills, night sweats, unintentional weight loss, fatigue, sore throat, headache, chest pain, SOB, abdominal pain, N/V/D and myalgia. \par \par Patient reports persistent enlarged lymph nodes in left groin. She denies pain and states lymph nodes have been enlarged for years. She follows with Heme/Onc every 3-6 months. She reports completing imaging at [facility] last done 3 months ago (CT chest and CT abdomen and pelvis).\par Oncologist - Dr. Surendra Trotter\par \par \par \par

## 2022-07-20 LAB
ALBUMIN SERPL ELPH-MCNC: 4.5 G/DL
ALP BLD-CCNC: 97 U/L
ALT SERPL-CCNC: 18 U/L
ANION GAP SERPL CALC-SCNC: 17 MMOL/L
AST SERPL-CCNC: 26 U/L
BASOPHILS # BLD AUTO: 0.06 K/UL
BASOPHILS NFR BLD AUTO: 0.7 %
BILIRUB SERPL-MCNC: 0.5 MG/DL
BUN SERPL-MCNC: 15 MG/DL
CALCIUM SERPL-MCNC: 9.8 MG/DL
CHLORIDE SERPL-SCNC: 101 MMOL/L
CO2 SERPL-SCNC: 24 MMOL/L
CREAT SERPL-MCNC: 0.82 MG/DL
EGFR: 71 ML/MIN/1.73M2
EOSINOPHIL # BLD AUTO: 0.25 K/UL
EOSINOPHIL NFR BLD AUTO: 2.8 %
GLUCOSE SERPL-MCNC: 82 MG/DL
HCT VFR BLD CALC: 44.4 %
HGB BLD-MCNC: 13.9 G/DL
IMM GRANULOCYTES NFR BLD AUTO: 0.3 %
LYMPHOCYTES # BLD AUTO: 2.68 K/UL
LYMPHOCYTES NFR BLD AUTO: 30.5 %
MAN DIFF?: NORMAL
MCHC RBC-ENTMCNC: 30.2 PG
MCHC RBC-ENTMCNC: 31.3 GM/DL
MCV RBC AUTO: 96.3 FL
MONOCYTES # BLD AUTO: 0.56 K/UL
MONOCYTES NFR BLD AUTO: 6.4 %
NEUTROPHILS # BLD AUTO: 5.21 K/UL
NEUTROPHILS NFR BLD AUTO: 59.3 %
PLATELET # BLD AUTO: 157 K/UL
POTASSIUM SERPL-SCNC: 4.7 MMOL/L
PROT SERPL-MCNC: 7.3 G/DL
RBC # BLD: 4.61 M/UL
RBC # FLD: 13.2 %
SODIUM SERPL-SCNC: 142 MMOL/L
WBC # FLD AUTO: 8.79 K/UL

## 2022-07-28 LAB — LDH SERPL-CCNC: 204 U/L

## 2022-10-14 ENCOUNTER — APPOINTMENT (OUTPATIENT)
Dept: CARE COORDINATION | Facility: HOME HEALTH | Age: 82
End: 2022-10-14

## 2022-10-14 ENCOUNTER — NON-APPOINTMENT (OUTPATIENT)
Age: 82
End: 2022-10-14

## 2023-06-19 NOTE — PHYSICAL EXAM
[No Acute Distress] : no acute distress [Well Nourished] : well nourished [Well Developed] : well developed [Normal Sclera/Conjunctiva] : normal sclera/conjunctiva [EOMI] : extraocular movements intact English [No Respiratory Distress] : no respiratory distress  [No Accessory Muscle Use] : no accessory muscle use [Clear to Auscultation] : lungs were clear to auscultation bilaterally [Normal Rate] : normal rate  [Regular Rhythm] : with a regular rhythm [Normal S1, S2] : normal S1 and S2 [No Edema] : there was no peripheral edema [Normal Appearance] : normal in appearance [No Nipple Discharge] : no nipple discharge [No Axillary Lymphadenopathy] : no axillary lymphadenopathy [Soft] : abdomen soft [Non Tender] : non-tender [Non-distended] : non-distended [No Masses] : no abdominal mass palpated [Normal Bowel Sounds] : normal bowel sounds [Normal Supraclavicular Nodes] : no supraclavicular lymphadenopathy [Normal Axillary Nodes] : no axillary lymphadenopathy [Normal Posterior Cervical Nodes] : no posterior cervical lymphadenopathy [Normal Anterior Cervical Nodes] : no anterior cervical lymphadenopathy [Normal Femoral Nodes] : no femoral lymphadenopathy [No CVA Tenderness] : no CVA  tenderness [No Spinal Tenderness] : no spinal tenderness [No Joint Swelling] : no joint swelling [Grossly Normal Strength/Tone] : grossly normal strength/tone [No Rash] : no rash [No Focal Deficits] : no focal deficits [Normal Affect] : the affect was normal [Normal Insight/Judgement] : insight and judgment were intact [de-identified] : +enlarged left inguinal nodes, suspect >1-2 cm in size , nontender [de-identified] : + anxious